# Patient Record
Sex: FEMALE | Race: WHITE | NOT HISPANIC OR LATINO | URBAN - METROPOLITAN AREA
[De-identification: names, ages, dates, MRNs, and addresses within clinical notes are randomized per-mention and may not be internally consistent; named-entity substitution may affect disease eponyms.]

---

## 2018-08-04 ENCOUNTER — EMERGENCY (EMERGENCY)
Facility: HOSPITAL | Age: 58
LOS: 1 days | Discharge: HOME | End: 2018-08-04
Attending: EMERGENCY MEDICINE
Payer: COMMERCIAL

## 2018-08-04 VITALS
DIASTOLIC BLOOD PRESSURE: 66 MMHG | OXYGEN SATURATION: 99 % | RESPIRATION RATE: 19 BRPM | TEMPERATURE: 98 F | SYSTOLIC BLOOD PRESSURE: 125 MMHG | HEART RATE: 68 BPM

## 2018-08-04 VITALS — HEIGHT: 66 IN | WEIGHT: 220.02 LBS

## 2018-08-04 DIAGNOSIS — Z90.710 ACQUIRED ABSENCE OF BOTH CERVIX AND UTERUS: Chronic | ICD-10-CM

## 2018-08-04 PROCEDURE — 99283 EMERGENCY DEPT VISIT LOW MDM: CPT

## 2018-08-04 PROCEDURE — 73000 X-RAY EXAM OF COLLAR BONE: CPT

## 2018-08-04 PROCEDURE — 73000 X-RAY EXAM OF COLLAR BONE: CPT | Mod: 26,LT

## 2018-08-04 PROCEDURE — 72110 X-RAY EXAM L-2 SPINE 4/>VWS: CPT

## 2018-08-04 PROCEDURE — 72110 X-RAY EXAM L-2 SPINE 4/>VWS: CPT | Mod: 26

## 2018-08-04 PROCEDURE — 99284 EMERGENCY DEPT VISIT MOD MDM: CPT

## 2018-08-04 RX ORDER — IBUPROFEN 200 MG
1 TABLET ORAL
Qty: 15 | Refills: 0 | OUTPATIENT
Start: 2018-08-04 | End: 2018-08-08

## 2018-08-04 RX ORDER — METHOCARBAMOL 500 MG/1
1 TABLET, FILM COATED ORAL
Qty: 15 | Refills: 0 | OUTPATIENT
Start: 2018-08-04 | End: 2018-08-08

## 2018-08-04 RX ORDER — IBUPROFEN 200 MG
600 TABLET ORAL ONCE
Qty: 0 | Refills: 0 | Status: COMPLETED | OUTPATIENT
Start: 2018-08-04 | End: 2018-08-04

## 2018-08-04 RX ORDER — LEVOTHYROXINE SODIUM 125 MCG
0 TABLET ORAL
Qty: 0 | Refills: 0 | COMMUNITY

## 2018-08-04 RX ORDER — METHOCARBAMOL 500 MG/1
1500 TABLET, FILM COATED ORAL ONCE
Qty: 0 | Refills: 0 | Status: COMPLETED | OUTPATIENT
Start: 2018-08-04 | End: 2018-08-04

## 2018-08-04 RX ADMIN — Medication 600 MILLIGRAM(S): at 13:40

## 2018-08-04 RX ADMIN — METHOCARBAMOL 1500 MILLIGRAM(S): 500 TABLET, FILM COATED ORAL at 13:40
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
## 2018-08-04 NOTE — ED ADULT NURSE NOTE - NSIMPLEMENTINTERV_GEN_ALL_ED
Implemented All Universal Safety Interventions:  [place] to call system. Call bell, personal items and telephone within reach. Instruct patient to call for assistance. Room bathroom lighting operational. Non-slip footwear when patient is off stretcher. Physically safe environment: no spills, clutter or unnecessary equipment. Stretcher in lowest position, wheels locked, appropriate side rails in place.
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
## 2018-08-04 NOTE — ED STATDOCS - ATTENDING CONTRIBUTION TO CARE
I, Lana Caldera, independently evaluated the patient. The APN made the initial evaluation and discussed history, physical and plan with me and I agree. I examined the patient noting tenderness to, but no deformity of left clavicle. I discussed indications to return to the ED and the importance of proper follow up with PMD. Patient verbalizes understanding and is comfortable with discharge at this time.

## 2018-08-04 NOTE — ED STATDOCS - MEDICAL DECISION MAKING DETAILS
Neurologically intact.  Will medicate with ibuprofen and robaxin, x-ray clavicle and lumbosacral spine, reevaluate.
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
## 2020-07-18 ENCOUNTER — EMERGENCY (EMERGENCY)
Facility: HOSPITAL | Age: 60
LOS: 1 days | Discharge: HOME | End: 2020-07-18
Attending: EMERGENCY MEDICINE
Payer: COMMERCIAL

## 2020-07-18 VITALS
WEIGHT: 225.97 LBS | HEIGHT: 65 IN | SYSTOLIC BLOOD PRESSURE: 148 MMHG | OXYGEN SATURATION: 99 % | HEART RATE: 121 BPM | TEMPERATURE: 98 F | DIASTOLIC BLOOD PRESSURE: 81 MMHG | RESPIRATION RATE: 20 BRPM

## 2020-07-18 DIAGNOSIS — Z90.710 ACQUIRED ABSENCE OF BOTH CERVIX AND UTERUS: Chronic | ICD-10-CM

## 2020-07-18 PROCEDURE — 99284 EMERGENCY DEPT VISIT MOD MDM: CPT

## 2020-07-18 PROCEDURE — 93010 ELECTROCARDIOGRAM REPORT: CPT

## 2020-07-18 PROCEDURE — 99053 MED SERV 10PM-8AM 24 HR FAC: CPT

## 2020-07-18 NOTE — ED ADULT TRIAGE NOTE - CHIEF COMPLAINT QUOTE
patient states that she has palpitations started at 2121 feeling it going up thru her neck, denies CP/N/V SOB

## 2020-07-19 VITALS
OXYGEN SATURATION: 98 % | RESPIRATION RATE: 18 BRPM | SYSTOLIC BLOOD PRESSURE: 104 MMHG | HEART RATE: 64 BPM | DIASTOLIC BLOOD PRESSURE: 62 MMHG

## 2020-07-19 PROBLEM — E03.9 HYPOTHYROIDISM, UNSPECIFIED: Chronic | Status: ACTIVE | Noted: 2018-08-04

## 2020-07-19 PROCEDURE — 71046 X-RAY EXAM CHEST 2 VIEWS: CPT

## 2020-07-19 PROCEDURE — 85610 PROTHROMBIN TIME: CPT

## 2020-07-19 PROCEDURE — 85027 COMPLETE CBC AUTOMATED: CPT

## 2020-07-19 PROCEDURE — 84436 ASSAY OF TOTAL THYROXINE: CPT

## 2020-07-19 PROCEDURE — 80053 COMPREHEN METABOLIC PANEL: CPT

## 2020-07-19 PROCEDURE — 71046 X-RAY EXAM CHEST 2 VIEWS: CPT | Mod: 26

## 2020-07-19 PROCEDURE — 85730 THROMBOPLASTIN TIME PARTIAL: CPT

## 2020-07-19 PROCEDURE — 83690 ASSAY OF LIPASE: CPT

## 2020-07-19 PROCEDURE — 84443 ASSAY THYROID STIM HORMONE: CPT

## 2020-07-19 PROCEDURE — 93005 ELECTROCARDIOGRAM TRACING: CPT

## 2020-07-19 PROCEDURE — 99283 EMERGENCY DEPT VISIT LOW MDM: CPT

## 2020-07-19 PROCEDURE — 83735 ASSAY OF MAGNESIUM: CPT

## 2020-07-19 PROCEDURE — 84484 ASSAY OF TROPONIN QUANT: CPT

## 2020-07-19 NOTE — ED ADULT NURSE NOTE - OBJECTIVE STATEMENT
Assumed pt. care at 0140. Pt. A&Ox3. pt. on monitor. pt. respirations even and unlabored. Pt. resting comfortably, in no apparent distress. Pt. states she was having palpitations earlier this evening, that have no resolves. Pt. denies chest pain or SOB.

## 2020-07-19 NOTE — ED PROVIDER NOTE - CONSTITUTIONAL, MLM
normal... obese female Well appearing, awake, alert, oriented to person, place, time/situation and in no apparent distress.

## 2020-07-19 NOTE — ED PROVIDER NOTE - CLINICAL SUMMARY MEDICAL DECISION MAKING FREE TEXT BOX
61 yo female no cardiac hx presenting with episode of tachycardia persisting 45 mins associated with neck pain. will keep on monitor. labs ekg and re-eval

## 2020-07-19 NOTE — ED PROVIDER NOTE - CARE PROVIDER_API CALL
Jeremy Callaway  [address] [place] [address]  Phone: [phone]  Fax: [phone]  Follow Up Time:

## 2020-07-19 NOTE — ED PROVIDER NOTE - CARDIAC, MLM
Normal rate, regular rhythm.  Heart sounds S1, S2.  No obvious murmurs no pedal edema no calf tenderness

## 2020-07-19 NOTE — ED PROVIDER NOTE - PATIENT PORTAL LINK FT
You can access the FollowMyHealth Patient Portal offered by [facility] by registering at the following website: [email or web address]. By joining [employer]’s FollowMyHealth portal, you will also be able to view your health information using other applications (apps) compatible with our system.

## 2020-07-19 NOTE — ED PROVIDER NOTE - ATTENDING CONTRIBUTION TO CARE
I, Mariano Christine, performed a face to face bedside interview with this patient regarding history of present illness, review of symptoms and relevant past medical, social and family history.  I completed an independent physical examination. I have communicated the patient’s plan of care and disposition with the ACP.  60 year old female with PMh hypothyroid presents with palpitations. States started suddenly while at rest, denies chest pain, SOB, leg swelling, fevers, and states Sx started to resolve after arriving here, states she feels well currently  Gen: NAD, well appearing  CV: RRR  Pul: CTA b/l  Abd: Soft, non-distended, non-tender  Neuro: no focal deficits  Pt improved, now NSR, well appearing, stable for dc and cardio f/u

## 2020-07-19 NOTE — ED PROVIDER NOTE - PROGRESS NOTE DETAILS
negative trops, no further episodes of tachycardia. advised on fu with [facility] cardiology

## 2020-07-19 NOTE — ED PROVIDER NOTE - OBJECTIVE STATEMENT
61 yo female hx of hypothyroidism on synthroid 125 daily compliant, presenting to the ER with racing heart/palpitations that started suddenly at 921 in the evening saturday that "radiated up and into the neck", describes experiencing some mild neck discomfort at that time as well. symptoms persisted, took 81mg aspirin and symptoms started to resolve. states that she feels better now. pt was tachycardic to 120's upon arrival now at bedside 90 NSR. pt denies cardiac hx had followed with [facility] cardiology for routine screening years ago with ekg and stress test, all which were negative as per patient. states that she felt fine all day up until onset. had 2 beers this evening. non smoker. no family hx cardiac issues. denies recent illness sick contacts fevers chills abdominal pain GI symptoms.

## 2021-04-05 ENCOUNTER — APPOINTMENT (OUTPATIENT)
Dept: DERMATOLOGY | Facility: CLINIC | Age: 61
End: 2021-04-05

## 2021-05-06 ENCOUNTER — APPOINTMENT (OUTPATIENT)
Dept: DERMATOLOGY | Facility: CLINIC | Age: 61
End: 2021-05-06

## 2021-05-06 PROBLEM — Z00.00 ENCOUNTER FOR PREVENTIVE HEALTH EXAMINATION: Status: ACTIVE | Noted: 2021-05-06

## 2021-05-07 ENCOUNTER — APPOINTMENT (OUTPATIENT)
Dept: OBGYN | Facility: CLINIC | Age: 61
End: 2021-05-07
Payer: COMMERCIAL

## 2021-05-07 VITALS
WEIGHT: 227 LBS | BODY MASS INDEX: 36.48 KG/M2 | DIASTOLIC BLOOD PRESSURE: 70 MMHG | HEIGHT: 66 IN | SYSTOLIC BLOOD PRESSURE: 130 MMHG

## 2021-05-07 DIAGNOSIS — Z78.9 OTHER SPECIFIED HEALTH STATUS: ICD-10-CM

## 2021-05-07 DIAGNOSIS — Z86.79 PERSONAL HISTORY OF OTHER DISEASES OF THE CIRCULATORY SYSTEM: ICD-10-CM

## 2021-05-07 DIAGNOSIS — I47.1 SUPRAVENTRICULAR TACHYCARDIA: ICD-10-CM

## 2021-05-07 DIAGNOSIS — Z86.2 PERSONAL HISTORY OF DISEASES OF THE BLOOD AND BLOOD-FORMING ORGANS AND CERTAIN DISORDERS INVOLVING THE IMMUNE MECHANISM: ICD-10-CM

## 2021-05-07 DIAGNOSIS — R32 UNSPECIFIED URINARY INCONTINENCE: ICD-10-CM

## 2021-05-07 DIAGNOSIS — Z86.39 PERSONAL HISTORY OF OTHER ENDOCRINE, NUTRITIONAL AND METABOLIC DISEASE: ICD-10-CM

## 2021-05-07 DIAGNOSIS — Z01.419 ENCOUNTER FOR GYNECOLOGICAL EXAMINATION (GENERAL) (ROUTINE) W/OUT ABNORMAL FINDINGS: ICD-10-CM

## 2021-05-07 PROCEDURE — 99072 ADDL SUPL MATRL&STAF TM PHE: CPT

## 2021-05-07 PROCEDURE — 99386 PREV VISIT NEW AGE 40-64: CPT

## 2021-05-07 PROCEDURE — 99213 OFFICE O/P EST LOW 20 MIN: CPT | Mod: 25
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
## 2021-05-07 NOTE — PLAN
[FreeTextEntry1] : 61 year old female wwe\par \par 1. Pap done\par 2. Rx screening mammo\par 3. Rx dexa\par 4. Has colonoscopy scheduled\par 5. Urinary incontinence - recommend urogyn workup\par 6. Annual exam in 1 year

## 2021-05-11 LAB
CYTOLOGY CVX/VAG DOC THIN PREP: NORMAL
HPV HIGH+LOW RISK DNA PNL CVX: NOT DETECTED

## 2021-06-02 ENCOUNTER — APPOINTMENT (OUTPATIENT)
Dept: DERMATOLOGY | Facility: CLINIC | Age: 61
End: 2021-06-02
Payer: COMMERCIAL

## 2021-06-02 ENCOUNTER — RESULT REVIEW (OUTPATIENT)
Age: 61
End: 2021-06-02

## 2021-06-02 PROCEDURE — 99072 ADDL SUPL MATRL&STAF TM PHE: CPT

## 2021-06-02 PROCEDURE — 11306 SHAVE SKIN LESION 0.6-1.0 CM: CPT

## 2021-06-02 PROCEDURE — 99204 OFFICE O/P NEW MOD 45 MIN: CPT | Mod: 25

## 2022-06-01 ENCOUNTER — APPOINTMENT (OUTPATIENT)
Dept: DERMATOLOGY | Facility: CLINIC | Age: 62
End: 2022-06-01
Payer: COMMERCIAL

## 2022-06-01 PROCEDURE — 99214 OFFICE O/P EST MOD 30 MIN: CPT

## 2023-07-27 ENCOUNTER — APPOINTMENT (OUTPATIENT)
Dept: ELECTROPHYSIOLOGY | Facility: CLINIC | Age: 63
End: 2023-07-27

## 2023-09-08 ENCOUNTER — OFFICE (OUTPATIENT)
Dept: URBAN - METROPOLITAN AREA CLINIC 115 | Facility: CLINIC | Age: 63
Setting detail: OPHTHALMOLOGY
End: 2023-09-08
Payer: COMMERCIAL

## 2023-09-08 DIAGNOSIS — H52.4: ICD-10-CM

## 2023-09-08 DIAGNOSIS — H25.13: ICD-10-CM

## 2023-09-08 DIAGNOSIS — H43.23: ICD-10-CM

## 2023-09-08 DIAGNOSIS — H11.153: ICD-10-CM

## 2023-09-08 PROBLEM — H47.321 DRUSEN OF OPTIC DISC; RIGHT EYE: Status: ACTIVE | Noted: 2023-09-08

## 2023-09-08 PROBLEM — H52.7 REFRACTIVE ERROR: Status: ACTIVE | Noted: 2023-09-08

## 2023-09-08 PROCEDURE — 92014 COMPRE OPH EXAM EST PT 1/>: CPT | Performed by: OPHTHALMOLOGY

## 2023-09-08 PROCEDURE — 92015 DETERMINE REFRACTIVE STATE: CPT | Performed by: OPHTHALMOLOGY

## 2023-09-08 ASSESSMENT — REFRACTION_MANIFEST
OD_VA1: 20/20
OS_VA1: 20/20
OD_ADD: +2.25
OD_SPHERE: +0.75
OS_ADD: +2.25
OD_VA1: 20/20
OD_SPHERE: +0.25
OS_SPHERE: +0.25
OS_AXIS: 090
OS_VA1: 20/20
OS_SPHERE: +0.25
OD_CYLINDER: -0.75
OD_CYLINDER: -1.50
OD_AXIS: 090
OD_ADD: +2.25
OS_CYLINDER: -1.00
OS_AXIS: 085
OD_AXIS: 090
OS_CYLINDER: -1.00
OS_ADD: +2.25
OU_VA: 20/20

## 2023-09-08 ASSESSMENT — SPHEQUIV_DERIVED
OS_SPHEQUIV: -0.25
OS_SPHEQUIV: -0.25
OD_SPHEQUIV: 0
OS_SPHEQUIV: -0.25
OD_SPHEQUIV: -0.125
OD_SPHEQUIV: 0

## 2023-09-08 ASSESSMENT — REFRACTION_AUTOREFRACTION
OD_AXIS: 085
OD_CYLINDER: -1.50
OS_CYLINDER: -1.00
OS_SPHERE: +0.25
OS_AXIS: 091
OD_SPHERE: +0.75

## 2023-09-08 ASSESSMENT — CONFRONTATIONAL VISUAL FIELD TEST (CVF)
OD_FINDINGS: FULL
OS_FINDINGS: FULL

## 2023-09-08 ASSESSMENT — REFRACTION_CURRENTRX
OD_CYLINDER: SPH
OS_SPHERE: +1.50
OD_SPHERE: +1.50
OS_VPRISM_DIRECTION: SV
OS_CYLINDER: SPH
OD_VPRISM_DIRECTION: SV
OD_OVR_VA: 20/
OS_OVR_VA: 20/

## 2023-09-08 ASSESSMENT — TONOMETRY
OD_IOP_MMHG: 17
OS_IOP_MMHG: 14

## 2023-09-08 ASSESSMENT — VISUAL ACUITY
OD_BCVA: 20/25
OS_BCVA: 20/25

## 2023-10-11 ENCOUNTER — APPOINTMENT (OUTPATIENT)
Dept: OBGYN | Facility: CLINIC | Age: 63
End: 2023-10-11

## 2024-02-26 ENCOUNTER — APPOINTMENT (OUTPATIENT)
Dept: ORTHOPEDIC SURGERY | Facility: CLINIC | Age: 64
End: 2024-02-26
Payer: COMMERCIAL

## 2024-02-26 VITALS — WEIGHT: 250 LBS | HEIGHT: 66 IN | BODY MASS INDEX: 40.18 KG/M2

## 2024-02-26 DIAGNOSIS — S49.91XA UNSPECIFIED INJURY OF RIGHT SHOULDER AND UPPER ARM, INITIAL ENCOUNTER: ICD-10-CM

## 2024-02-26 PROCEDURE — ZZZZZ: CPT

## 2024-02-26 RX ORDER — LOSARTAN POTASSIUM 100 MG/1
TABLET, FILM COATED ORAL
Refills: 0 | Status: ACTIVE | COMMUNITY

## 2024-02-26 RX ORDER — LEVOTHYROXINE SODIUM 112 UG/1
112 TABLET ORAL
Refills: 0 | Status: ACTIVE | COMMUNITY

## 2024-02-26 NOTE — HISTORY OF PRESENT ILLNESS
[de-identified] : The patient is a 63 year old RIGHT hand dominant female who presents today complaining of R shoulder pain.   Date of Injury/Onset: ~10/23 Pain:    At Rest: 0/10  With Activity:  8/10  Mechanism of injury: Patient reported being struck directly in the involved shoulder while moving boxes.  This is NOT a Work Related Injury being treated under Worker's Compensation. This is NOT an athletic injury occurring associated with an interscholastic or organized sports team. Quality of symptoms: sharp, stabbing Improves with: activity modification, HEP Worse with: lifting objects, rotational motions Prior treatment: HEP Prior Imaging: X-ray ([facility]) Out of work/sport: _, since _ School/Sport/Position/Occupation:  Additional Information:

## 2024-02-26 NOTE — PHYSICAL EXAM
[de-identified] : Right Shoulder: proximal biceps tenderness +Impingement test +Neer test +Acuña test +Cyrus sign 4-/5 Supraspinatus Strength stabalized abduction is 70 on right compared to 90 on left.

## 2024-02-26 NOTE — DISCUSSION/SUMMARY
[de-identified] : MRI of the right shoulder to eval Rotator cuff tear. Follow up after scan.   This has been going on for couple of month, patient has tried 6 weeks of at home exercises without any improvement.  RB&A to corticosteroid injection discussed. All questions were answered. Patient wishes to move forward with injection today.  Right Shoulder Subacromial steroid injection - ULTRASOUND GUIDED  Patient Identification Name/: Verbal with patient and/or family   Procedure Verification: Procedure confirmed with patient or family/designee Consent for procedure: Verbal Consent Given Relevant documentation completed, reviewed, and signed Clinical indications for procedure confirmed  Time-out with all members of procedure team immediately prior to procedure: Correct patient identified. Agreement on procedure. Correct side and site.  SHOULDER SUBACROMIAL INJECTION - RIGHT After verbal consent and identification of the correct patient and correct site, the RIGHT posterolateral shoulder was prepped using alcohol swabs and betadine. This was allowed time to air dry. A mixture of Kenalog, Bupivacaine was injected into the right shoulder subacromial space using a sterile 22G needle after ethyl chloride spray for skin anesthesia. The patient tolerated the procedure well.  A sterile dressing was placed.  After-care instructions were provided and included instructions to ice the area and to call if redness   ----------------------------------------------- Home Exercise The patient is instructed on a home exercise program.  ASHLEY SORENSEN Acting as a Scribe for Dr. Kristen MARIA, Ashley Sorensen, attest that this documentation has been prepared under the direction and in the presence of Provider Nicolás Peterson MD.  Activity Modification The patient was advised to modify their activities.  Dx / Natural History The patient was advised of the diagnosis.  The natural history of the pathology was explained in full to the patient in layman's terms.  Several different treatment options were discussed and explained in full to the patient including the risks and benefits of both surgical and non-surgical treatments.  All questions and concerns were answered.  Pain Guide Activities The patient was advised to let pain guide the gradual advancement of activities.  OBI MARIA explained to the patient that rest, ice, compression, and elevation would benefit them.  They may return to activity after follow-up or when they no longer have any pain.  The patient's current medication management of their orthopedic diagnosis was reviewed today: (1) We discussed a comprehensive treatment plan that included possible pharmaceutical management involving the use of prescription strength medications including but not limited to options such as oral Naprosyn 500mg BID, once daily Meloxicam 15 mg, or 500-650 mg Tylenol versus over the counter oral medications and topical prescription NSAID Pennsaid vs over the counter Voltaren gel. (2) There is a moderate risk of morbidity with further treatment, especially from use of prescription strength medications and possible side effects of these medications which include upset stomach with oral medications, skin reactions to topical medications and cardiac/renal issues with long term use. (3) I recommended that the patient follow-up with their medical physician to discuss any significant specific potential issues with long term medication use such as interactions with current medications or with exacerbation of underlying medical comorbidities. (4) The benefits and risks associated with use of injectable, oral or topical, prescription and over the counter anti-inflammatory medications were discussed with the patient. The patient voiced understanding of the risks including but not limited to bleeding, stroke, kidney dysfunction, heart disease, and were referred to the black box warning label for further information.

## 2024-03-04 ENCOUNTER — RESULT REVIEW (OUTPATIENT)
Age: 64
End: 2024-03-04

## 2024-03-08 ENCOUNTER — APPOINTMENT (OUTPATIENT)
Dept: ORTHOPEDIC SURGERY | Facility: CLINIC | Age: 64
End: 2024-03-08
Payer: COMMERCIAL

## 2024-03-08 VITALS — BODY MASS INDEX: 40.18 KG/M2 | HEIGHT: 66 IN | WEIGHT: 250 LBS

## 2024-03-08 DIAGNOSIS — M79.18 MYALGIA, OTHER SITE: ICD-10-CM

## 2024-03-08 DIAGNOSIS — M25.511 PAIN IN RIGHT SHOULDER: ICD-10-CM

## 2024-03-08 DIAGNOSIS — S49.91XA UNSPECIFIED INJURY OF RIGHT SHOULDER AND UPPER ARM, INITIAL ENCOUNTER: ICD-10-CM

## 2024-03-08 PROCEDURE — ZZZZZ: CPT

## 2024-03-08 NOTE — DATA REVIEWED
[FreeTextEntry1] : 03/04/24 zp mri of the right shoulder: 1. High-grade partial-thickness intrasubstance tear in the supraspinatus tendon at the footprint spanning 7 mm in AP dimension. 2. High-grade partial tear in the superior subscapularis tendon with mild medial subluxation of the long head of biceps tendon. 3. Partial tearing the long head of biceps tendon proximally in the intertubercular groove. 4. Superior labral tear with partial tearing the biceps labral anchor. 5. Mild to moderate subacromial subdeltoid bursitis.

## 2024-03-08 NOTE — HISTORY OF PRESENT ILLNESS
[de-identified] : The patient is a 63 year old RIGHT hand dominant female who presents today complaining of R shoulder pain.   Date of Injury/Onset: ~10/23 Pain:    At Rest: 0/10  With Activity:  8/10  Mechanism of injury: Patient reported being struck directly in the involved shoulder while moving boxes.  This is NOT a Work Related Injury being treated under Worker's Compensation. This is NOT an athletic injury occurring associated with an interscholastic or organized sports team. Quality of symptoms: sharp, stabbing Improves with: activity modification, HEP Worse with: lifting objects, rotational motions Treatment/Imaging/Studies Since Last Visit: MRI 	Reports Available For Review Today: _ Out of work/sport: _, since _ School/Sport/Position/Occupation:  Change since last visit:  Additional Information: None

## 2024-03-08 NOTE — PHYSICAL EXAM
[de-identified] : Right Shoulder: proximal biceps tenderness +Impingement test +Neer test +Acuña test +Cyrus sign 4-/5 Supraspinatus Strength stabalized abduction is 70 on right compared to 90 on left.

## 2024-04-12 ENCOUNTER — APPOINTMENT (OUTPATIENT)
Dept: ORTHOPEDIC SURGERY | Facility: CLINIC | Age: 64
End: 2024-04-12

## 2024-09-13 ENCOUNTER — OFFICE (OUTPATIENT)
Dept: URBAN - METROPOLITAN AREA CLINIC 115 | Facility: CLINIC | Age: 64
Setting detail: OPHTHALMOLOGY
End: 2024-09-13
Payer: COMMERCIAL

## 2024-09-13 DIAGNOSIS — H25.13: ICD-10-CM

## 2024-09-13 DIAGNOSIS — H47.321: ICD-10-CM

## 2024-09-13 DIAGNOSIS — H52.4: ICD-10-CM

## 2024-09-13 DIAGNOSIS — H11.153: ICD-10-CM

## 2024-09-13 DIAGNOSIS — H43.23: ICD-10-CM

## 2024-09-13 PROCEDURE — 92015 DETERMINE REFRACTIVE STATE: CPT | Performed by: OPHTHALMOLOGY

## 2024-09-13 PROCEDURE — 92014 COMPRE OPH EXAM EST PT 1/>: CPT | Performed by: OPHTHALMOLOGY

## 2024-09-13 ASSESSMENT — CONFRONTATIONAL VISUAL FIELD TEST (CVF)
OS_FINDINGS: FULL
OD_FINDINGS: FULL

## 2024-11-22 ENCOUNTER — APPOINTMENT (OUTPATIENT)
Dept: OBGYN | Facility: CLINIC | Age: 64
End: 2024-11-22
Payer: COMMERCIAL

## 2024-11-22 VITALS
BODY MASS INDEX: 33.15 KG/M2 | DIASTOLIC BLOOD PRESSURE: 81 MMHG | WEIGHT: 199 LBS | SYSTOLIC BLOOD PRESSURE: 150 MMHG | HEIGHT: 65 IN

## 2024-11-22 DIAGNOSIS — K64.4 RESIDUAL HEMORRHOIDAL SKIN TAGS: ICD-10-CM

## 2024-11-22 DIAGNOSIS — N76.2 ACUTE VULVITIS: ICD-10-CM

## 2024-11-22 PROCEDURE — 99214 OFFICE O/P EST MOD 30 MIN: CPT

## 2024-11-22 RX ORDER — FLUCONAZOLE 150 MG/1
150 TABLET ORAL
Qty: 2 | Refills: 2 | Status: ACTIVE | COMMUNITY
Start: 2024-11-22 | End: 1900-01-01

## 2024-11-22 RX ORDER — HYDROCORTISONE 25 MG/G
2.5 CREAM TOPICAL
Qty: 1 | Refills: 3 | Status: ACTIVE | COMMUNITY
Start: 2024-11-22 | End: 1900-01-01

## 2024-11-22 RX ORDER — CLOTRIMAZOLE AND BETAMETHASONE DIPROPIONATE 10; .5 MG/G; MG/G
1-0.05 CREAM TOPICAL TWICE DAILY
Qty: 1 | Refills: 2 | Status: ACTIVE | COMMUNITY
Start: 2024-11-22 | End: 1900-01-01

## 2025-01-06 NOTE — DISCUSSION/SUMMARY
[de-identified] : Reviewed all images with patient. Zp for proximal biceps csi injection with Dr Joel. Physical therapy prescribed for strengthening and stretching. Packet of exercises provided for home strengthening and/or stretching. Patient will follow up in 6 weeks.  can consider arth. surgery if fails injections and PT    ----------------------------------------------- Home Exercise The patient is instructed on a home exercise program.  ASHLEY SORENSEN Acting as a Scribe for Dr. Kristen MARIA, Ashley Sorensen, attest that this documentation has been prepared under the direction and in the presence of Provider Nicolás Peterson MD.  Activity Modification The patient was advised to modify their activities.  Dx / Natural History The patient was advised of the diagnosis.  The natural history of the pathology was explained in full to the patient in layman's terms.  Several different treatment options were discussed and explained in full to the patient including the risks and benefits of both surgical and non-surgical treatments.  All questions and concerns were answered.  Pain Guide Activities The patient was advised to let pain guide the gradual advancement of activities.  RICE I explained to the patient that rest, ice, compression, and elevation would benefit them.  They may return to activity after follow-up or when they no longer have any pain.  The patient's current medication management of their orthopedic diagnosis was reviewed today: (1) We discussed a comprehensive treatment plan that included possible pharmaceutical management involving the use of prescription strength medications including but not limited to options such as oral Naprosyn 500mg BID, once daily Meloxicam 15 mg, or 500-650 mg Tylenol versus over the counter oral medications and topical prescription NSAID Pennsaid vs over the counter Voltaren gel. (2) There is a moderate risk of morbidity with further treatment, especially from use of prescription strength medications and possible side effects of these medications which include upset stomach with oral medications, skin reactions to topical medications and cardiac/renal issues with long term use. (3) I recommended that the patient follow-up with their medical physician to discuss any significant specific potential issues with long term medication use such as interactions with current medications or with exacerbation of underlying medical comorbidities. (4) The benefits and risks associated with use of injectable, oral or topical, prescription and over the counter anti-inflammatory medications were discussed with the patient. The patient voiced understanding of the risks including but not limited to bleeding, stroke, kidney dysfunction, heart disease, and were referred to the black box warning label for further information.  Duration Of Freeze Thaw-Cycle (Seconds): 10 Show Aperture Variable?: Yes Number Of Freeze-Thaw Cycles: 1 freeze-thaw cycle Application Tool (Optional): Cry-AC Consent: The patient's consent was obtained including but not limited to risks of crusting, scabbing, blistering, scarring, darker or lighter pigmentary change, recurrence, incomplete removal and infection. Post-Care Instructions: I reviewed with the patient in detail post-care instructions. Patient is to wear sunprotection, and avoid picking at any of the treated lesions. Pt may apply Vaseline to crusted or scabbing areas. Detail Level: Simple Render Note In Bullet Format When Appropriate: No Medical Necessity Clause: This procedure was medically necessary because the lesions that were treated were intensely: Number Of Freeze-Thaw Cycles: 2 freeze-thaw cycles Detail Level: Detailed Medical Necessity Information: It is in your best interest to select a reason for this procedure from the list below. All of these items fulfill various CMS LCD requirements except the new and changing color options. Post-Care Instructions: I reviewed with the patient in detail post-care instructions. Patient is to wear sunprotection, and avoid picking at any of the treated lesions. Patient may apply Vaseline to crusted or scabbed areas. Spray Paint Text: The liquid nitrogen was applied to the skin utilizing a spray paint frosting technique.

## 2025-03-14 ENCOUNTER — APPOINTMENT (OUTPATIENT)
Dept: OBGYN | Facility: CLINIC | Age: 65
End: 2025-03-14

## 2025-03-14 VITALS
WEIGHT: 196 LBS | SYSTOLIC BLOOD PRESSURE: 110 MMHG | HEIGHT: 65 IN | DIASTOLIC BLOOD PRESSURE: 70 MMHG | BODY MASS INDEX: 32.65 KG/M2

## 2025-03-14 PROCEDURE — 99459 PELVIC EXAMINATION: CPT | Mod: NC

## 2025-03-14 PROCEDURE — 99386 PREV VISIT NEW AGE 40-64: CPT

## 2025-03-17 LAB
C TRACH RRNA SPEC QL NAA+PROBE: NOT DETECTED
N GONORRHOEA RRNA SPEC QL NAA+PROBE: NOT DETECTED
SOURCE AMPLIFICATION: NORMAL
SOURCE TP AMPLIFICATION: NORMAL
T VAGINALIS RRNA SPEC QL NAA+PROBE: NOT DETECTED

## 2025-03-19 LAB
CYTOLOGY CVX/VAG DOC THIN PREP: NORMAL
HPV HIGH+LOW RISK DNA PNL CVX: NOT DETECTED

## 2025-06-02 NOTE — ED ADULT TRIAGE NOTE - BP NONINVASIVE DIASTOLIC (MM HG)
Situation:  Outreach for routine follow up.    Key Assessments:  Pt states she is hungry- she grabbed cheetos today for lunch because she failed to pack a proper lunch and was very busy and hungry. Pt aware this is loaded with sodium. Pt denied any adverse symptoms currently.  She plans on taking her BP when she gets home and making salmon with cucumber for dinner. She did eat quite a bit of watermelon today and is hydrating. Pt is maintaining weight lose. Discussed SW outreach .     Actions Taken:  Reviewed  recent RPM BP readings,  Factors that effect BP readings,     Program Plan:   Follow up after 21 days. Continue educating patient regarding Hypertension and Hypertension healthy lifestyle, monitor for understanding of instruction previously provided, the integration of recommended behaviors, and evaluate patient's commitment to report any Hypertension symptoms early to their clinician. All questions addressed ,CM to follow remotely.      See hyperlinks within encounter for full documentation    
81